# Patient Record
Sex: FEMALE | Race: BLACK OR AFRICAN AMERICAN | URBAN - METROPOLITAN AREA
[De-identification: names, ages, dates, MRNs, and addresses within clinical notes are randomized per-mention and may not be internally consistent; named-entity substitution may affect disease eponyms.]

---

## 2017-01-02 ENCOUNTER — HOSPITAL ENCOUNTER (OUTPATIENT)
Dept: BEHAVIORAL HEALTH | Facility: CLINIC | Age: 22
End: 2017-01-02
Attending: PSYCHIATRY & NEUROLOGY
Payer: COMMERCIAL

## 2017-01-02 PROCEDURE — H0035 MH PARTIAL HOSP TX UNDER 24H: HCPCS

## 2017-01-02 NOTE — PROGRESS NOTES
[facility]   Dr. Huang's Psychiatric Progress Note  2017      Patient:  Radha Bonilla   Medical Record Number:  [id number]  :  1995          Interim History:   The patient's care was discussed with the treatment team and chart notes were reviewed.  Pt had a good weekend.  Grandma was sick, and pt was woken up a lot.  She did not make it to [facility].  She watched [place], played card with cousin, visited with friends.  .      Psychiatric ROS:  Mood:   good  Sleep:  Poor as sick grandma was awake a lot coughing which kept pt up  Appetite:normal  Eating:normal  Energy Level:   low  Concentration/Memory Problems:  Good in groups  Suicidal Thoughts:No  Homicidal Thoughts:No  Psychotic Symptoms: No  Medication Side Effects:No  Medication Compliance:Yes   Physical Complaints:negative         Medications:     Past Psych Meds:  prazosin, Cymbalta and propranolol    Current Outpatient Prescriptions   Medication Sig     PRAZOSIN HCL PO Take 2 tabs by mouth At Bedtime     PROPRANOLOL HCL PO Take by mouth daily     DULOXETINE HCL PO Take 60mg daily     No current facility-administered medications for this encounter.             Allergies:   No Known Allergies         Psychiatric Examination:   There were no vitals taken for this visit.  Weight is 0 lbs 0 oz  There is no weight on file to calculate BMI.    Appearance:  awake, alert and adequately groomed  Attitude:  cooperative  Eye Contact:  good  Mood:  good  Affect:  mood congruent  Speech:  clear, coherent  Psychomotor Behavior:  no evidence of tardive dyskinesia, dystonia, or tics  Throught Process:  logical  Associations:  no loose associations  Thought Content:  no evidence of suicidal ideation or homicidal ideation and no evidence of psychotic thought  Insight:  good  Judgement:  intact  Oriented to:  time, person, and place  Attention Span and Concentration:  intact  Recent and Remote Memory:   intact  Gait:Normal    Risk/Potential for Dangerousness:  Multiple Active Diagnoses:HIGH  Self Care:MODERATE  Suicide:LOW  Assault:LOW  Self Injurious Behaviors:LOW  Inappropriate Sexual Behavior:LOW         Labs:   No results found for this or any previous visit (from the past 24 hour(s)).     No results found for this or any previous visit (from the past 1008 hour(s)).      Impression:   This is a 21 year old female continues PHP for mood stabilization.   Mood is improving.  Groups are helpful.           DIagnoses:     DIAGNOSES:    Axis I:  Major depressive disorder, recurrent, posttraumatic stress disorder.  Generalized anxiety disorder.  Panic disorder.    Axis II:  Borderline personality traits.    Axis III:  Obesity.             Plan:     Continue [facility] Program.    Increased Prazosin to 2 tabs qHS.   Increased Cymbalta from 30 mg daily to 60 mg daily.    Expect stabilization and completion of Partial Hospital Program.    Ms. Bonilla will be leaving in a few weeks for a semester in [place].   Leaves on 1/17/17.     Follow up at completion of partial hospital program with Dr. Franny Salazar at [facility] and therapist, Lisandra Dejesus at [facility].      Marlo Huang MD

## 2017-01-02 NOTE — PROGRESS NOTES
Group Therapy Progress Notes     Area of Treatment Focus:  Symptom Management, Personal Safety, Community Resources/Discharge Planning, Abstinence/Relapse Prevention, Develop / Improve Independent Living Skills and Develop Socialization / Interpersonal Relationship Skills    Therapeutic Interventions/Treatment Strategies:  Support, Safety Assessments, Structured Activity and Education    Response to Treatment Strategies:  Accepted Feedback, Listened, Focused on Goals, Attentive and Accepted Support    Name of Group:  Psychotherapy Group, Self Talk, Anxiety    Progress Note  Radha reports feeling tired. Her grandmother has been sick with vomitting the past few days which has impacted Radha's sleep. She was able to clean and watch Netflix over the weekend. She continues to prepare for her semester abroad in [place]. She is not going to be able to attend partial program tomorrow due to having a physical health appointment as well as getting the Typhoid vaccination. Radha was engaged in self talk and participated in mapping the impact of a change physiologically, emotionally, cognitively,behaviorally and spiritually.  We discussed how changes in lifestyle and thought patterns impact the sympathetic nervous system. Radha also learned about cognitive behavioral tools, such as, a Thought Record to process and reframe cognition.  Radha was engaged in groups. She openly shared her feelings especially about the relationship with her mom. She participated in group activities.  She will continue in the partial program.        Is this a Weekly Review of the Progress on the Treatment Plan?  Yes.  Radha has been making progress toward her treatment plan goals. She  would like to implement mindfulness more in her lifestyle.  Radha would like to set boundaries and limits within relationships.    Are Treatment Plan Goals being addressed?  No, treatment goals revised      Are Treatment Plan Strategies to Address  Goals Effective?  Yes, continue treatment strategies      Are there any current contracts in place?  No

## 2017-01-02 NOTE — PROGRESS NOTES
Group Therapy Progress Notes     Area of Treatment Focus:  Symptom Management and Develop / Improve Independent Living Skills    Therapeutic Interventions/Treatment Strategies:  Support, Feedback, Safety Assessments, Structured Activity, Problem Solving, Clarification and Education    Response to Treatment Strategies:  Accepted Feedback, Gave Feedback, Listened, Focused on Goals, Attentive, Accepted Support and Alert    Name of Group:  Name of Group:     1) Mental Health Management: Life skills      Progress Note  1) Radha attended and participated in a mental health management group focused on education, application of taught concepts for improved functioning in lifes roles and valued activities. Today the group focused on gratitude and its neuro-connection to happiness. Writer provided psycho-ed on the neuroscience of gratitude based on strong research evidence, then group members were given opportunity to apply the knowledge to their own life and make a plan for including practicing gratefulness in an appropriate manner. A variety of tools/strategies were provided to explore. Radha verbalizes understanding of taught concepts and was able to demonstrate understanding of one potential way of practicing gratefulness. She was interested in the inter-connectivity and positive feedback loop of gratefulness and appreciated being reminded of the importance to thank people for even small things. Affect even, very engaged, asks good appropriate questions that benefit the group.     Is this a Weekly Review of the Progress on the Treatment Plan?  No.

## 2017-01-05 ENCOUNTER — HOSPITAL ENCOUNTER (OUTPATIENT)
Dept: BEHAVIORAL HEALTH | Facility: CLINIC | Age: 22
End: 2017-01-05
Attending: PSYCHIATRY & NEUROLOGY
Payer: COMMERCIAL

## 2017-01-05 PROCEDURE — H0035 MH PARTIAL HOSP TX UNDER 24H: HCPCS
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
## 2017-01-05 NOTE — PROGRESS NOTES
"Group Therapy Progress Notes     Date: 1/05/17    Area of Treatment Focus:  Symptom Management and Develop / Improve Independent Living Skills    Therapeutic Interventions/Treatment Strategies:  Support, Feedback, Safety Assessments, Structured Activity, Problem Solving, Clarification and Education    Response to Treatment Strategies:  Accepted Feedback, Gave Feedback, Listened, Focused on Goals, Attentive, Accepted Support and Alert    Name of Group:  Name of Group:     Mental Health Management: Life skills    Progress Note  Radha attended and participated in a mental health management group focused on education, application of taught concepts for improved functioning in lifes roles and valued activities. Today the group focused on trust. Group members were introduced to \"the anatomy of trust\" by Pal Wooten, which breaks trust down into parts. B.R.A.V.I.N.G was presented (boundaries, reliability, accountability, vault, integrity, non-judgmental, and generosity) which provided group members a frame of reference when discerning their own relationships with friends and family as well as the concept of self-trust and equating it to self-love. Group members were given an opportunity to share what resonated most with them and apply it to either a relationship or themselves. Radha identified that she is becoming aware of the need to work on being non-judgmental about herself and her friends. She verbalizes finding the content helpful and demonstrates understanding.Affect even, engaged.     Is this a Weekly Review of the Progress on the Treatment Plan?  Yes.      Are Treatment Plan Goals being addressed?  Yes, continue treatment goals      Are Treatment Plan Strategies to Address Goals Effective?  Yes, continue treatment strategies      Are there any current contracts in place?  No            .             "

## 2017-01-05 NOTE — PROGRESS NOTES
Group Therapy Progress Notes     Area of Treatment Focus:  Symptom Management, Personal Safety, Community Resources/Discharge Planning, Abstinence/Relapse Prevention, Develop / Improve Independent Living Skills and Develop Socialization / Interpersonal Relationship Skills    Therapeutic Interventions/Treatment Strategies:  Support, Safety Assessments, Structured Activity and Education    Response to Treatment Strategies:  Accepted Feedback, Listened, Focused on Goals, Attentive and Accepted Support    Name of Group:  Communication    Progress Note  Radha was able to identify the signs and symptoms of anger as well as interpersonal relationship themes in her life. We discussed the flight and fight response, its purpose and explored ways to sensory regulate and communicate needs.       Is this a Weekly Review of the Progress on the Treatment Plan?    No

## 2017-01-06 ENCOUNTER — HOSPITAL ENCOUNTER (OUTPATIENT)
Dept: BEHAVIORAL HEALTH | Facility: CLINIC | Age: 22
End: 2017-01-06
Attending: PSYCHIATRY & NEUROLOGY
Payer: COMMERCIAL

## 2017-01-06 PROCEDURE — H0035 MH PARTIAL HOSP TX UNDER 24H: HCPCS

## 2017-01-09 ENCOUNTER — HOSPITAL ENCOUNTER (OUTPATIENT)
Dept: BEHAVIORAL HEALTH | Facility: CLINIC | Age: 22
End: 2017-01-09
Attending: PSYCHIATRY & NEUROLOGY
Payer: COMMERCIAL

## 2017-01-09 PROCEDURE — H0035 MH PARTIAL HOSP TX UNDER 24H: HCPCS

## 2017-01-09 NOTE — PROGRESS NOTES
Group Therapy Progress Notes     Area of Treatment Focus:  Symptom Management, Personal Safety, Community Resources/Discharge Planning, Abstinence/Relapse Prevention, Develop / Improve Independent Living Skills and Develop Socialization / Interpersonal Relationship Skills    Therapeutic Interventions/Treatment Strategies:  Support, Safety Assessments, Structured Activity and Education    Response to Treatment Strategies:  Accepted Feedback, Listened, Focused on Goals, Attentive and Accepted Support    Name of Group:  Self Talk    Progress Note  Radha was able to identify ways to increase awareness about her automatic thoughts, assumptions and core beliefs. We explored tools for cognitive reframing and cultivated new core beliefs. She was open and engaged in group.  Her grandfather picked her up from the program to go to her psychiatrist appointment this afternoon with Dr. Franny Salazar at [facility] in [place]. Writer spoke with  at [facility] to get a reauthorization through 1/13/17.         Is this a Weekly Review of the Progress on the Treatment Plan?  No
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
## 2017-01-10 ENCOUNTER — HOSPITAL ENCOUNTER (OUTPATIENT)
Dept: BEHAVIORAL HEALTH | Facility: CLINIC | Age: 22
End: 2017-01-10
Attending: PSYCHIATRY & NEUROLOGY
Payer: COMMERCIAL

## 2017-01-10 PROCEDURE — H0035 MH PARTIAL HOSP TX UNDER 24H: HCPCS

## 2017-01-10 NOTE — PROGRESS NOTES
Group Therapy Progress Notes     Date: 1/09/17    Area of Treatment Focus:  Symptom Management and Develop / Improve Independent Living Skills    Therapeutic Interventions/Treatment Strategies:  Support, Feedback, Safety Assessments, Structured Activity, Problem Solving, Clarification and Education    Response to Treatment Strategies:  Accepted Feedback, Gave Feedback, Listened, Focused on Goals, Attentive, Accepted Support and Alert    Name of Group:  Name of Group:     Mental Health Management: Life skills    Progress Note  Flory attended and participated in a structured life skills group where intervention focused on learning, developing, and practicing coping skills and strategies through structured experiential psycho-education to improve function in valued roles, routines, and independent living skills.  Focus of session was on interpersonal skills, specifically boundaries. Group members watched a short video on healthy boundaries and relationship to integrity, generosity, and empathy. They then were provided psycho-education on a range of relationship boundry styles including rigid, porous, and healthy followed by a facilitated structured discussion on their personal experiences and the differences between those styles. Flory is able to come up with an appropriate personal example/situation to which she applied the new learning and discussed the potential impact on her stated relationship. Handouts provided. Supportive of peers. Affect even.     Is this a Weekly Review of the Progress on the Treatment Plan?  Yes.      Are Treatment Plan Goals being addressed?  Yes, continue treatment goals      Are Treatment Plan Strategies to Address Goals Effective?  Yes, continue treatment strategies      Are there any current contracts in place?  No            .

## 2017-01-11 NOTE — PROGRESS NOTES
Group Therapy Progress Notes     Area of Treatment Focus:  Symptom Management, Personal Safety, Community Resources/Discharge Planning, Abstinence/Relapse Prevention, Develop / Improve Independent Living Skills and Develop Socialization / Interpersonal Relationship Skills    Therapeutic Interventions/Treatment Strategies:  Support, Safety Assessments, Structured Activity and Education    Response to Treatment Strategies:  Accepted Feedback, Listened, Focused on Goals, Attentive and Accepted Support    Name of Group:  Psychotherapy Group and Education Support Group    Progress Note  Radha reports feeling good. She is excited about her trip but also anxious. Denies S/I or safety issues. She met with her psychiatrist who is going to give her a 6 month supply of psychotropic medication for when she is in [place].  Radha invited her grandma to the education support group. Together they discussed the pattern of her mood symptoms, coping skills needed and the role of support needed.  Her grandmother also discussed how they will be having check ins while she is in [place] studying abroad.  Radha reports needing more time to prepare for the semester abroad. She would like tomorrow 1/11/17 to be her last day in the partial program.        Is this a Weekly Review of the Progress on the Treatment Plan?  No

## 2017-01-16 NOTE — ADDENDUM NOTE
Encounter addended by: Janet Dawn OTR/L on: 1/16/2017 10:28 AM<BR>     Documentation filed: Inpatient Document Flowsheet, Episodes